# Patient Record
Sex: MALE
[De-identification: names, ages, dates, MRNs, and addresses within clinical notes are randomized per-mention and may not be internally consistent; named-entity substitution may affect disease eponyms.]

---

## 2017-01-23 ENCOUNTER — APPOINTMENT (OUTPATIENT)
Dept: HUMAN REPRODUCTION | Facility: CLINIC | Age: 42
End: 2017-01-23

## 2017-01-23 PROBLEM — Z00.00 ENCOUNTER FOR PREVENTIVE HEALTH EXAMINATION: Status: ACTIVE | Noted: 2017-01-23

## 2017-02-24 ENCOUNTER — APPOINTMENT (OUTPATIENT)
Dept: HUMAN REPRODUCTION | Facility: CLINIC | Age: 42
End: 2017-02-24

## 2019-08-05 ENCOUNTER — APPOINTMENT (OUTPATIENT)
Dept: ORTHOPEDIC SURGERY | Facility: CLINIC | Age: 44
End: 2019-08-05
Payer: COMMERCIAL

## 2019-08-05 DIAGNOSIS — S66.911D STRAIN OF UNSPECIFIED MUSCLE, FASCIA AND TENDON AT WRIST AND HAND LEVEL, RIGHT HAND, SUBSEQUENT ENCOUNTER: ICD-10-CM

## 2019-08-05 DIAGNOSIS — Z78.9 OTHER SPECIFIED HEALTH STATUS: ICD-10-CM

## 2019-08-05 DIAGNOSIS — Z72.89 OTHER PROBLEMS RELATED TO LIFESTYLE: ICD-10-CM

## 2019-08-05 PROCEDURE — 99203 OFFICE O/P NEW LOW 30 MIN: CPT

## 2019-08-05 RX ORDER — MELOXICAM 15 MG/1
15 TABLET ORAL
Qty: 30 | Refills: 1 | Status: ACTIVE | COMMUNITY
Start: 2019-08-05 | End: 1900-01-01

## 2019-08-05 NOTE — PHYSICAL EXAM
[Normal] : Oriented to person, place, and time, insight and judgement were intact and the affect was normal [de-identified] : Right wrist exam\par \par Skin to the right wrist is intact with no erythema, abrasions, or ecchymosis. There are no gross deformities. There is no swelling. There is tenderness to palpation near the SL interval. There is no snuffbox tenderness or tenderness to the ulnar fovea. Range of motion is fully intact with flexion and extension, pronation and supination. Sensation is grossly intact and capillary refill is brisk.

## 2019-08-05 NOTE — ASSESSMENT
[FreeTextEntry1] : Patient with right wrist pain. The etiology of his pain is unclear at this time. I would like to rule out a ganglion cyst that is occult and nature. Today the patient wishes not to obtain an x-ray. He states that his pain has improved slightly over the last 24 hours. He states that his pain returns, he will return to the office for an x-ray. At which point I would like to obtain an MRI to rule out an occult wrist ganglion. In the meantime, I will send the patient home with a wrist brace and meloxicam for an anti-inflammatory. The patient is in agreement with this plan and will return p.r.n.

## 2019-08-05 NOTE — HISTORY OF PRESENT ILLNESS
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 He presents the office today with complaints of right wrist pain at start near the muscle interval and radiate around the entire wrist. Complains that the pain is dull and achy. This pain has been going on for about one week in the absence of any specific injury. He states that he does feel somewhat better with ice and immobilization and also gets some mild relief from Aleve and Advil. He denies any paresthesias of the hand.

## 2020-10-06 ENCOUNTER — APPOINTMENT (OUTPATIENT)
Dept: ORTHOPEDIC SURGERY | Facility: CLINIC | Age: 45
End: 2020-10-06
Payer: COMMERCIAL

## 2020-10-06 VITALS
SYSTOLIC BLOOD PRESSURE: 160 MMHG | HEIGHT: 68 IN | WEIGHT: 180 LBS | HEART RATE: 64 BPM | BODY MASS INDEX: 27.28 KG/M2 | TEMPERATURE: 97.3 F | DIASTOLIC BLOOD PRESSURE: 95 MMHG

## 2020-10-06 DIAGNOSIS — M25.561 PAIN IN RIGHT KNEE: ICD-10-CM

## 2020-10-06 PROCEDURE — 99213 OFFICE O/P EST LOW 20 MIN: CPT

## 2020-10-08 PROBLEM — M25.561 ACUTE PAIN OF RIGHT KNEE: Status: ACTIVE | Noted: 2020-10-06

## 2020-10-08 NOTE — PHYSICAL EXAM
[de-identified] : Left knee:\par Knee: Range of Motion in Degrees	\par 	                  Claimant:	Normal:	\par Flexion Active	  135 	                135-degrees	\par Flexion Passive	  135	                135-degrees	\par Extension Active	  0-5	                0-5-degrees	\par Extension Passive	  0-5	                0-5-degrees	\par \par No weakness to flexion/extension.  No evidence of instability in the AP plane or varus or valgus stress.  Negative  Lachman.  Negative pivot shift.  Negative anterior drawer test.  Negative posterior drawer test.  Negative Melissa.  Negative Apley grind.  No medial or lateral joint line tenderness.  No tenderness over the medial and lateral facet of the patella.  No patellofemoral crepitations.  No lateral tilting patella.  No patellar apprehension.  No crepitation in the medial and lateral femoral condyle.  No proximal or distal swelling, edema or tenderness.  No gross motor or sensory deficits.  No intra-articular swelling.  2+ DP and PT pulses. No varus or valgus malalignment.  Skin is intact.  No rashes, scars or lesions.  \par  \par Right knee:\par    Knee: Range of Motion in Degrees	\par 	                  Claimant:	Normal:	\par Flexion Active	  135 	                135-degrees	\par Flexion Passive	  135	                135-degrees	\par Extension Active	  0-5	                0-5-degrees	\par Extension Passive	  0-5	                0-5-degrees	\par \par He has no major pain. He has no pain on palpation of the medial or lateral joint line.  No weakness to flexion/extension.  No evidence of instability in the AP plane or varus or valgus stress.  Negative  Lachman.  Negative pivot shift.  Negative anterior drawer test.  Negative posterior drawer test.  Negative Melissa.  Negative Apley grind.  No medial or lateral joint line tenderness.  No tenderness over the medial and lateral facet of the patella.  No patellofemoral crepitations.  No lateral tilting patella.  No patellar apprehension.  No crepitation in the medial and lateral femoral condyle.  No proximal or distal swelling, edema or tenderness.  No gross motor or sensory deficits.  No intra-articular swelling.  No varus or valgus malalignment.  Skin is intact.  No rashes, scars or lesions.  No calf tenderness.  Good distal pulses.\par   [de-identified] : Gait: Slightly antalgic to antalgic gait.  Station: Normal  [de-identified] : Appearance: Well-developed, well-nourished male  in no acute distress.  [de-identified] : The patient denied x-rays (as noted above).

## 2020-10-08 NOTE — HISTORY OF PRESENT ILLNESS
[de-identified] : Umer comes in today with complaints of right knee pain.  The patient states he has been having the pain for a few weeks and it is intermittent.  He states that it hurts him only when he bears weight to the extremity and goes into a flex position.  He states it feels unstable.  It should noted, the patient denies x-ray at this time.  The patient was encouraged to get an x-ray and he continued to deny the x-ray.  He was educated that there cannot be a full diagnosis without any x-ray.   This injury is not work related or due to an automobile accident.  The patient states the pain is localized.  The patient describes the pain as sharp.  The patient states keeping weight off of it and rest make the symptoms better while adding weight makes the symptoms worse.  [5] : the ailment interference is 5/10 [2] : the ailment interference is 2/10 [(Does not interfere) 0] : the ailment interference is 0/10 (does not interfere) [8] : the ailment interference is 8/10 [9] : the ailment interference is 9/10 [7] : the ailment interference is 7/10 [] : Yes [de-identified] : The patient states he almost fell down stairs.

## 2020-10-08 NOTE — ADDENDUM
[FreeTextEntry1] : This note was written by Deirdre Dillard on 10/08/2020 acting as scribe for Alicja Rodriguez, KALYANIR/DAMEON, PA.\par

## 2020-10-08 NOTE — DISCUSSION/SUMMARY
[de-identified] : The patient presents with right knee pain.  The patient is going to do some physical therapy at this time.  He will return to the office in six weeks or as needed.  We have given him a physical therapy script for some strengthening and hopefully decrease some pain.

## 2023-11-27 ENCOUNTER — NON-APPOINTMENT (OUTPATIENT)
Age: 48
End: 2023-11-27

## 2023-11-27 ENCOUNTER — APPOINTMENT (OUTPATIENT)
Dept: OTOLARYNGOLOGY | Facility: CLINIC | Age: 48
End: 2023-11-27
Payer: COMMERCIAL

## 2023-11-27 VITALS — HEIGHT: 67 IN | BODY MASS INDEX: 26.68 KG/M2 | WEIGHT: 170 LBS

## 2023-11-27 DIAGNOSIS — H93.293 OTHER ABNORMAL AUDITORY PERCEPTIONS, BILATERAL: ICD-10-CM

## 2023-11-27 DIAGNOSIS — H61.23 IMPACTED CERUMEN, BILATERAL: ICD-10-CM

## 2023-11-27 PROCEDURE — 99203 OFFICE O/P NEW LOW 30 MIN: CPT | Mod: 25

## 2023-11-27 PROCEDURE — 69210 REMOVE IMPACTED EAR WAX UNI: CPT

## 2024-08-27 ENCOUNTER — APPOINTMENT (OUTPATIENT)
Dept: ORTHOPEDIC SURGERY | Facility: CLINIC | Age: 49
End: 2024-08-27
Payer: COMMERCIAL

## 2024-08-27 ENCOUNTER — NON-APPOINTMENT (OUTPATIENT)
Age: 49
End: 2024-08-27

## 2024-08-27 DIAGNOSIS — M25.552 PAIN IN LEFT HIP: ICD-10-CM

## 2024-08-27 DIAGNOSIS — M16.12 UNILATERAL PRIMARY OSTEOARTHRITIS, LEFT HIP: ICD-10-CM

## 2024-08-27 PROCEDURE — 99204 OFFICE O/P NEW MOD 45 MIN: CPT

## 2024-08-27 PROCEDURE — 73502 X-RAY EXAM HIP UNI 2-3 VIEWS: CPT | Mod: LT

## 2024-08-27 NOTE — PHYSICAL EXAM
[de-identified] : Left hip:  Pain with deep flexion and IR.  Limited internal rotation. Straight leg raise: Negative Minimally tender palpation over greater trochanter Contralateral Hip: Normal ROM without pain on IR/ER Grossly normal motor and sensory examination of bilateral lower extremities [de-identified] : 8/27/24: AP Pelvis and lateral of the left hip were reviewed and demonstrate degenerative joint disease of the hip with joint space narrowing, osteophyte formation, and bone-on-bone sclerosis.

## 2024-08-27 NOTE — HISTORY OF PRESENT ILLNESS
[de-identified] : 8/27/24: Patient initially injured his groin from running in January. He paused running and started doing yoga and other strengthening exercises. However, he experienced exacerbation in his hip pain months later following a trip to [email or web address]. Now the hip and groin pain seem to improve but reactivates with physical activity, limiting his physical activities.  Pain is mostly in the groin but also has some pain on the lateral hip but that is now resolved.  Allergies: NKDA Hx of DVT/PE: Denies AC: Denies Tobacco: Denies PMH: Denies

## 2024-08-27 NOTE — DISCUSSION/SUMMARY
[de-identified] : Left hip osteoarthritis  Extensive discussion of the natural history of this disease was had with the patient.  We discussed the treatment options ranging from conservative therapy which includes anti-inflammatories, steroid/HA injections, physical therapy, weight loss, knee sleeves/braces, and activity modification.  We did discuss that the ultimate treatment for arthritis is a joint replacement.  However at this time we have decided to continue with conservative treatment.   Recommend avoiding high-impact activities. Patient will follow-up as needed if the pain returns or does not improve.  The patient's current medication management of their orthopedic diagnosis was reviewed today: (1) We discussed a comprehensive treatment plan that included possible pharmaceutical management involving the use of prescription strength medications including but not limited to options such as oral Ibuprofen 400mg QID, once daily Meloxicam 15 mg, or 500-650 mg Tylenol versus over the counter oral medications and topical prescription NSAID Pennsaid vs over the counter Voltaren gel. (2) There is a moderate risk of morbidity with further treatment, especially from use of prescription strength medications and possible side effects of these medications which include upset stomach with oral medications, skin reactions to topical medications and cardiac/renal issues with long term use. (3) I recommended that the patient follow-up with their medical physician to discuss any significant specific potential issues with long term medication use such as interactions with current medications or with exacerbation of underlying medical comorbidities. (4) The benefits and risks associated with use of injectable, oral or topical, prescription and over the counter anti-inflammatory medications were discussed with the patient. The patient voiced understanding of the risks including but not limited to bleeding, stroke, kidney dysfunction, heart disease, and were referred to the black box warning label for further information.

## 2024-10-14 ENCOUNTER — APPOINTMENT (OUTPATIENT)
Dept: ORTHOPEDIC SURGERY | Facility: CLINIC | Age: 49
End: 2024-10-14
Payer: COMMERCIAL

## 2024-10-14 VITALS
BODY MASS INDEX: 26.68 KG/M2 | HEIGHT: 67 IN | HEART RATE: 76 BPM | SYSTOLIC BLOOD PRESSURE: 159 MMHG | DIASTOLIC BLOOD PRESSURE: 100 MMHG | WEIGHT: 170 LBS

## 2024-10-14 DIAGNOSIS — M16.12 UNILATERAL PRIMARY OSTEOARTHRITIS, LEFT HIP: ICD-10-CM

## 2024-10-14 PROCEDURE — 99214 OFFICE O/P EST MOD 30 MIN: CPT

## 2024-10-14 RX ORDER — MELOXICAM 15 MG/1
15 TABLET ORAL DAILY
Qty: 30 | Refills: 1 | Status: ACTIVE | COMMUNITY
Start: 2024-10-14 | End: 1900-01-01